# Patient Record
(demographics unavailable — no encounter records)

---

## 2021-03-29 NOTE — NUR
[id number] FROM HOME C/O BLEEDING FROM SHUNT X40MIN PTA LEFT UPPER EXTREMITY. THE 
PATIENT DENIES PAIN. IN ROOM AIR AND DENIES SOB. RESPIRATION REGULAR AND 
UNLABORED. ATTACHED ON A MONITOR. WILL CONTINUE TO MONITOR.

## 2021-07-13 NOTE — NUR
PATIENT BIBRA 39 FROM HOME C/O LEFT ARM FISTULA DISLOGED AT HOME BY ACCIDENT. 
PATIENT IS A/O X 4, RR EVEN AND UNLABORED, NO SIGNS OF SOB NOTED. PATIENT 
CONNECTED TO CARDIAC MONITOR AND POX.

## 2021-07-13 NOTE — NUR
PT'S SON, NELLA, AT BEDSIDE AND STATED THAT HE WILL OBTAIN LIST OF PT'S HOME MEDS. HE CAN 
ALSO BE REACHED AT [phone].

## 2021-07-13 NOTE — NUR
TELE RN ADMISSION NOTE



PT TRANSPORTED VIA GURNEY TO UNIT AT THIS TIME. PT ADMITTED TO TELE UNDER DR. NATASHA ARAYA 
FOR ADMITTING DX OF DIALYSIS ACCESS BLEED, ANEMIA, AND GENERALIZED WEAKNESS. A/O X4, 
Marshallese-SPEAKING. PT'S SON, NELLA, AT BEDSIDE AND STATED THAT HE WILL OBTAIN LIST OF PT'S 
HOME MEDS. PT STABLE ON ROOM AIR. NO SOB OR S/S OF RESPIRATORY DISTRESS NOTED. PT ON 
EXTERNAL CARDIAC MONITOR READING ST  BPM. PT HAS NO C/O PAIN OR DISCOMFORT AT THIS 
TIME. IV ACCESS IN RIGHT AC #20. DRESSING NOTED OVER LEFT ARM A/V FISTULA. PT ORIENTED TO 
STAFF, ROOM, AND UNIT. SAFETY MEASURES MAINTAINED. BED IN LOWEST LOCKED POSITION, HOB 
ELEVATED, SIDE RAILS UP X2. CALL LIGHT AND TABLE WITHIN REACH. WILL CONTINUE TO MONITOR.

## 2021-07-14 NOTE — NUR
TELE/RN OPENING NOTE



PATIENT ARRIVED TO FLOOR FROM OR AT APPROX. 20:30. OR RN AT BEDSIDE. VS: /51 HR 77 RR 
20 R 97.8 O2 % ON 2L O2 VIA NC. PATIENT HAD REPAIR TO LEFT UPPER ARM FISTULA. 
DRESSING IS CLEAN, DRY AND INTACT. DRESSING TO REMAIN ON PER ORDERS. PATIENT TO HAVE 
DIALYSIS TOMORROW USING LOWER PART OF LEFT AV FISTULA. PATIENT TO RECEIVED 1 UNIT PRBC 
DURING DIALYSIS TOMORROW. PATIENT DENIES PAIN AT THIS TIME. ALERT AND ORIENTED X 4. 
PRIMARILY Croatian SPEAKING. ORDER TO RESUME RENAL DIET IN PLACE. CALL LIGHT WITHIN REACH. 
ASPIRATION, FALL AND SAFETY PRECAUTIONS MAINTAINED. WILL CONTINUE TO MONITOR.

## 2021-07-14 NOTE — NUR
TELE/RN NOTE



PATIENTS BLOOD GLUCOSE LEVEL . PATIENT REFUSING INSULIN AT THIS TIME. WILL CONTINUE TO 
MONITOR.

## 2021-07-14 NOTE — NUR
TELE RN CLOSING NOTE



PT IS AWAKE IN BED. A/O X4, Qatari-SPEAKING. PT STABLE ON ROOM AIR. NO SOB OR S/S OF 
RESPIRATORY DISTRESS NOTED. PT ON EXTERNAL CARDIAC MONITOR READING ST  BPM. PT HAS NO 
C/O PAIN OR DISCOMFORT AT THIS TIME. IV ACCESS IN RIGHT AC #20. DRESSING NOTED OVER LEFT ARM 
A/V FISTULA. ALL NEEDS HAVE BEEN MET. SAFETY PRECAUTIONS MAINTAINED AT ALL TIMES. BED IN 
LOWEST LOCKED POSITION, HOB ELEVATED, SIDE RAILS UP X2. CALL LIGHT AND TABLE WITHIN REACH. 
WILL ENDORSE TO ONCOMING NURSE FOR YUE.

## 2021-07-14 NOTE — NUR
PT REFUSED ACCUCHECK AT THIS TIME. EDUCATED PT ON RISKS OF REFUSING ACCUCHECK AND PT 
UNDERSTANDS. WILL CONTINUE TO MONITOR.

## 2021-07-14 NOTE — NUR
RN OPENING NOTE



RECEIVED PATIENT IN BED. A/O X4. Citizen of the [place] SPEAKING. ON ROOM AIR, NO SOB NOTED. IN NO APPARENT 
DISTRESS. TELE READING SHOWS SR 76 WITH PAC. IV ACCESS ON R AC #20 G, INTACT AND PATENT. 
SAFETY MEASURES MAINTAINED. BED IN LOWEST POSITION, BRAKES LOCKED. SIDE RAILS UP X2. CALL 
LIGHT WITHIN REACH. WILL CONTINUE PLAN OF CARE.

## 2021-07-15 NOTE — NUR
TELE/RN CLOSING NOTE



PATIENT CURRENTLY SLEEPING IN BED. ALERT AND ORIENTED X 4. ABLE TO MAKE NEEDS KNOWN. DENIES 
PAIN AT THIS TIME. PATIENT IS ON ROOM AIR WITH NO S/SX OF RESPIRATORY DISTRESS NOTED. IV 
ACCESS TO RIGHT AC #20G INTACT, PATENT AND SALINE LOCKED. DRESSING TO LEFT AVF CLEAN, DRY 
AND INTACT. TELE MONITOR READING SR WITH PVCS/PACS. CALL LIGHT WITHIN REACH. ASPIRATION, 
FALL AND SAFETY PRECAUTIONS MAINTAINED. WILL ENDORSE PLAN OF CARE TO ONCOMING SHIFT.

## 2021-07-15 NOTE — NUR
TELE RN NOTES



SPOKE WITH SON NELLA. NOTIFIED HIM RE EZIO'S PROCEDURE FOR HIS DAD. MADE AWARE RE RISKS AND 
BENEFITS OF PROCEDURE. TRANSLATED WITH HIS DAD. PT AWARE. PT SIGNED CONSENTS. WILL CONTINUE 
TO MONITOR.

## 2021-07-15 NOTE — NUR
TELE/RN NOTE



PATIENT REFUSED BLOOD GLUCOSE CHECK THIS AM. PATIENT ALSO REFUSED ADL CARE THIS AM. PATIENT 
GETTING ANGRY/AGGRESSIVE TOWARDS CNA. REFUSED X 2 ATTEMPTS.

## 2021-07-15 NOTE — NUR
TELE RN NOTES



SPOKE WITH DR ALMAGUER (ANESTHESIOLOGIST) RE PT'S BT STATUS. PER MD, PT NEEDS AT LEAST 
2 UNITS OF PRBC TONIGHT. ALSO WILL NEED 2 UNITS OF TYPE & CROSS BLOOD FOR EZIO'S PROCEDURE. 
ORDERS CLARIFIED WITH EPIC ON CALL ARAYA NP. WITH NEW ORDERS MADE. ORDERS NOTED AND CARRIED 
OUT. WILL CONTINUE TO MONITOR.

## 2021-07-15 NOTE — NUR
Patient is AOx4 Turkmen speaking watching tv request PRN Tylenol and it was effective, left 
arm fistula with dressing, is fall risk with bed alarm, patient is scheduled for surgery on 
7/16, NPO @midnight, medication list has been updated, patient at 100% of meal no SOD nor 
SOB, V/S are as followed B/P 137/55, P 82, Resp. 18, Temp.98.4, O2Sat. 96%, call light is 
within reach, will continue to monitor.

## 2021-07-15 NOTE — NUR
TELE RN NOTES



1ST UNIT PRBC TRANSFUSING WELL. NO S/SX OF TRANSFUSION RXN NOTED. VSS. AFEBRILE. WILL 
CONTINUE TO MONITOR

## 2021-07-15 NOTE — NUR
TELE RN NOTES



CLARIFIED ORDERS  WITH QUIANA NP FOR BT RE PT'S LOW H/H. WITH NEW ORDERS TO TRANSFUSE 1 UNIT 
PRBC. WILL CONTINUE TO MONITOR.

## 2021-07-16 NOTE — NUR
RN NOTE



PT DIALYSIS POSTPONED TO POST CATHETER PLACEMENT. LABS REVIEWED BY MD AND DIALYSIS NURSE. MD 
OKAYED FOR DIALYSIS POSTPONEMENT. WILL CONTINUE TO MONITOR.

## 2021-07-16 NOTE — NUR
RN CLOSING NOTE



PT AWAKE IN BED RESTING. ON 2L NC O2 WITH NO SOB OR RESPIRATORY DISTRESS PRESENT. A/O X4 AND 
[place] Ver[place]an SPEAKING. ON CARDIAC MONITOR. NO EDEMA PRESENT. ON BEDREST WITH DIAPER PRESENT. R CW 
HD CATH PRESENT. HD ORDERED TOMORROW. L ARM DRESSING PRESENT. WOUND CARE ORDERED. RENAL 
DIET. IV PRESENT ON R AC 20G AND FLUSHES WELL. LABS AND ORDERS REVIEWED. ROUTINE MEDS GIVEN. 
SAFETY MEASURES IN PLACE. SIDE RAILS RAISED. BED LOWERED. CALL LIGHT WITHIN REACH. REPORT 
GIVEN TO NIGHT NURSE FOR YUE.

## 2021-07-16 NOTE — NUR
TELE RN NOTES



2ND UNIT PRBC TRANSFUSING WELL. NO S/SX OF TRANSFUSION RXN NOTED. VSS. AFEBRILE. WILL 
CONTINUE TO MONITOR

## 2021-07-16 NOTE — NUR
RN OPENING NOTE



PT AWAKE IN BED RESTING. ON RA WITH NO SOB OR RESPIRATORY DISTRESS PRESENT. A/O X4 AND 
Marshallese SPEAKING.  NEEDED. ON CARDIAC MONITOR. NO EDEMA PRESENT. ON BEDREST WITH 
DIAPER PRESENT. L ARM FISTULA WITH DRESSING PRESENT. DRESSING TO NOT BE REMOVED PER MD 
ORDER. NPO POST MIDNIGHT DUE TO UPCOMING SURGERY. CONSENTS AND CHECKLIST IN CHART. IV 
PRESENT ON R AC 20G AND FLUSHES WELL. LABS AND ORDERS REVIEWED. SAFETY MEASURES IN PLACE. 
SIDE RAILS RAISED. BED LOWERED. CALL LIGHT WITHIN REACH. WILL CONTINUE TO MONITOR.

## 2021-07-16 NOTE — NUR
TELE RN NOTES



2ND UNIT PRBC COMPLETED. NO S/SX OF TRANSFUSION RXN NOTED. VSS. AFEBRILE. WILL CONTINUE TO 
MONITOR

## 2021-07-16 NOTE — NUR
TELE RN NOTES



1ST UNIT PRBC COMPLETED. NO S/SX OF TRANSFUSION RXN NOTED. VSS. AFEBRILE. WILL CONTINUE TO 
MONITOR

## 2021-07-16 NOTE — NUR
TELE RN NOTES



AWAKE & RESPONSIVE. NOT IN ANY DISTRESS. NO SOB NOTED. DENIES ANY PAIN OR DISCOMFORT AT THIS 
TIME. ON TELE SR @ 83 WITH PACS WITH IV-HL PATENT & INTACT. AM CARE DONE. MONITORED 
ACCORDINGLY. CALL LIGHT WITHIN REACH. BED IN LOWEST POSITION. SR UP X 2 FOR SAFETY. WILL 
ENDORSE TO NEXT SHIFT.

## 2021-07-16 NOTE — NUR
RN NOTE



PT TRANSFERRED TO OR FOR PROCEDURE. CONSENTS SIGNED BY PT AND PREPROCEDURE CHECKLIST DONE. 
PT IN STABLE CONDITION.

## 2021-07-16 NOTE — NUR
Tele RN Notes



Patient's blood sugar at 2220 was 153mg/dL. 

-------------------------------------------------------------------------------

Addendum: 07/17/21 at 0744 by KRISTINE ROSE RN

-------------------------------------------------------------------------------

Pt refused his insulin.

## 2021-07-17 NOTE — NUR
received pt. in am alert and oriented. on call light often.Citizen of [place] speaking only.

## 2021-07-17 NOTE — NUR
son here.given all instructions.lt. arm dressing done.son observing.to have dialysis rn do 
dressing.review of all home meds.all belongings sent home with pt.taken to lobby via w/c 
accompanied by cna and son.